# Patient Record
Sex: FEMALE | Race: WHITE | NOT HISPANIC OR LATINO | URBAN - METROPOLITAN AREA
[De-identification: names, ages, dates, MRNs, and addresses within clinical notes are randomized per-mention and may not be internally consistent; named-entity substitution may affect disease eponyms.]

---

## 2017-01-16 ENCOUNTER — IMPORTED ENCOUNTER (OUTPATIENT)
Dept: URBAN - METROPOLITAN AREA CLINIC 50 | Facility: CLINIC | Age: 82
End: 2017-01-16

## 2017-01-20 ENCOUNTER — IMPORTED ENCOUNTER (OUTPATIENT)
Dept: URBAN - METROPOLITAN AREA CLINIC 50 | Facility: CLINIC | Age: 82
End: 2017-01-20

## 2017-03-16 PROBLEM — Z94.7: Noted: 2020-01-15

## 2017-03-16 PROBLEM — Z98.890: Noted: 2017-03-16

## 2017-05-15 ENCOUNTER — IMPORTED ENCOUNTER (OUTPATIENT)
Dept: URBAN - METROPOLITAN AREA CLINIC 50 | Facility: CLINIC | Age: 82
End: 2017-05-15

## 2017-09-25 ENCOUNTER — IMPORTED ENCOUNTER (OUTPATIENT)
Dept: URBAN - METROPOLITAN AREA CLINIC 50 | Facility: CLINIC | Age: 82
End: 2017-09-25

## 2018-03-26 ENCOUNTER — IMPORTED ENCOUNTER (OUTPATIENT)
Dept: URBAN - METROPOLITAN AREA CLINIC 50 | Facility: CLINIC | Age: 83
End: 2018-03-26

## 2018-04-20 ENCOUNTER — IMPORTED ENCOUNTER (OUTPATIENT)
Dept: URBAN - METROPOLITAN AREA CLINIC 50 | Facility: CLINIC | Age: 83
End: 2018-04-20

## 2018-04-30 ENCOUNTER — IMPORTED ENCOUNTER (OUTPATIENT)
Dept: URBAN - METROPOLITAN AREA CLINIC 50 | Facility: CLINIC | Age: 83
End: 2018-04-30

## 2018-05-07 ENCOUNTER — IMPORTED ENCOUNTER (OUTPATIENT)
Dept: URBAN - METROPOLITAN AREA CLINIC 50 | Facility: CLINIC | Age: 83
End: 2018-05-07

## 2018-09-14 ENCOUNTER — IMPORTED ENCOUNTER (OUTPATIENT)
Dept: URBAN - METROPOLITAN AREA CLINIC 50 | Facility: CLINIC | Age: 83
End: 2018-09-14

## 2018-10-04 ENCOUNTER — IMPORTED ENCOUNTER (OUTPATIENT)
Dept: URBAN - METROPOLITAN AREA CLINIC 50 | Facility: CLINIC | Age: 83
End: 2018-10-04

## 2018-10-05 ENCOUNTER — IMPORTED ENCOUNTER (OUTPATIENT)
Dept: URBAN - METROPOLITAN AREA CLINIC 50 | Facility: CLINIC | Age: 83
End: 2018-10-05

## 2018-10-23 ENCOUNTER — IMPORTED ENCOUNTER (OUTPATIENT)
Dept: URBAN - METROPOLITAN AREA CLINIC 50 | Facility: CLINIC | Age: 83
End: 2018-10-23

## 2018-11-16 ENCOUNTER — IMPORTED ENCOUNTER (OUTPATIENT)
Dept: URBAN - METROPOLITAN AREA CLINIC 50 | Facility: CLINIC | Age: 83
End: 2018-11-16

## 2019-02-28 ENCOUNTER — IMPORTED ENCOUNTER (OUTPATIENT)
Dept: URBAN - METROPOLITAN AREA CLINIC 50 | Facility: CLINIC | Age: 84
End: 2019-02-28

## 2019-03-04 ENCOUNTER — IMPORTED ENCOUNTER (OUTPATIENT)
Dept: URBAN - METROPOLITAN AREA CLINIC 50 | Facility: CLINIC | Age: 84
End: 2019-03-04

## 2019-04-01 ENCOUNTER — IMPORTED ENCOUNTER (OUTPATIENT)
Dept: URBAN - METROPOLITAN AREA CLINIC 50 | Facility: CLINIC | Age: 84
End: 2019-04-01

## 2019-04-08 ENCOUNTER — IMPORTED ENCOUNTER (OUTPATIENT)
Dept: URBAN - METROPOLITAN AREA CLINIC 50 | Facility: CLINIC | Age: 84
End: 2019-04-08

## 2019-06-11 ENCOUNTER — IMPORTED ENCOUNTER (OUTPATIENT)
Dept: URBAN - METROPOLITAN AREA CLINIC 50 | Facility: CLINIC | Age: 84
End: 2019-06-11

## 2019-08-22 ENCOUNTER — IMPORTED ENCOUNTER (OUTPATIENT)
Dept: URBAN - METROPOLITAN AREA CLINIC 50 | Facility: CLINIC | Age: 84
End: 2019-08-22

## 2019-09-04 ENCOUNTER — IMPORTED ENCOUNTER (OUTPATIENT)
Dept: URBAN - METROPOLITAN AREA CLINIC 50 | Facility: CLINIC | Age: 84
End: 2019-09-04

## 2019-09-20 ENCOUNTER — IMPORTED ENCOUNTER (OUTPATIENT)
Dept: URBAN - METROPOLITAN AREA CLINIC 50 | Facility: CLINIC | Age: 84
End: 2019-09-20

## 2019-10-16 ENCOUNTER — IMPORTED ENCOUNTER (OUTPATIENT)
Dept: URBAN - METROPOLITAN AREA CLINIC 50 | Facility: CLINIC | Age: 84
End: 2019-10-16

## 2019-10-18 ENCOUNTER — IMPORTED ENCOUNTER (OUTPATIENT)
Dept: URBAN - METROPOLITAN AREA CLINIC 50 | Facility: CLINIC | Age: 84
End: 2019-10-18

## 2019-11-22 ENCOUNTER — IMPORTED ENCOUNTER (OUTPATIENT)
Dept: URBAN - METROPOLITAN AREA CLINIC 50 | Facility: CLINIC | Age: 84
End: 2019-11-22

## 2019-12-05 ENCOUNTER — IMPORTED ENCOUNTER (OUTPATIENT)
Dept: URBAN - METROPOLITAN AREA CLINIC 50 | Facility: CLINIC | Age: 84
End: 2019-12-05

## 2019-12-05 NOTE — PATIENT DISCUSSION
"""Recommend lubrication with artificial tears and flax seed oil 1000mg orally twice a day.  Claribel ""

## 2019-12-13 ENCOUNTER — IMPORTED ENCOUNTER (OUTPATIENT)
Dept: URBAN - METROPOLITAN AREA CLINIC 50 | Facility: CLINIC | Age: 84
End: 2019-12-13

## 2019-12-18 ENCOUNTER — IMPORTED ENCOUNTER (OUTPATIENT)
Dept: URBAN - METROPOLITAN AREA CLINIC 50 | Facility: CLINIC | Age: 84
End: 2019-12-18

## 2019-12-30 ENCOUNTER — IMPORTED ENCOUNTER (OUTPATIENT)
Dept: URBAN - METROPOLITAN AREA CLINIC 50 | Facility: CLINIC | Age: 84
End: 2019-12-30

## 2020-01-09 ENCOUNTER — IMPORTED ENCOUNTER (OUTPATIENT)
Dept: URBAN - METROPOLITAN AREA CLINIC 50 | Facility: CLINIC | Age: 85
End: 2020-01-09

## 2020-01-10 ENCOUNTER — IMPORTED ENCOUNTER (OUTPATIENT)
Dept: URBAN - METROPOLITAN AREA CLINIC 50 | Facility: CLINIC | Age: 85
End: 2020-01-10

## 2020-01-15 ENCOUNTER — IMPORTED ENCOUNTER (OUTPATIENT)
Dept: URBAN - METROPOLITAN AREA CLINIC 50 | Facility: CLINIC | Age: 85
End: 2020-01-15

## 2020-01-15 PROBLEM — Z98.890: Noted: 2017-03-16

## 2020-01-15 PROBLEM — Z94.7: Noted: 2020-01-15

## 2020-01-15 NOTE — PATIENT DISCUSSION
"""S/P DSEK OD. Graft attached and clearing.  Continue post - op drops."" ""Start Durezol right eye three times a day

## 2020-01-17 ENCOUNTER — IMPORTED ENCOUNTER (OUTPATIENT)
Dept: URBAN - METROPOLITAN AREA CLINIC 50 | Facility: CLINIC | Age: 85
End: 2020-01-17

## 2020-01-23 ENCOUNTER — IMPORTED ENCOUNTER (OUTPATIENT)
Dept: URBAN - METROPOLITAN AREA CLINIC 50 | Facility: CLINIC | Age: 85
End: 2020-01-23

## 2020-02-10 ENCOUNTER — IMPORTED ENCOUNTER (OUTPATIENT)
Dept: URBAN - METROPOLITAN AREA CLINIC 50 | Facility: CLINIC | Age: 85
End: 2020-02-10

## 2020-03-05 ENCOUNTER — IMPORTED ENCOUNTER (OUTPATIENT)
Dept: URBAN - METROPOLITAN AREA CLINIC 50 | Facility: CLINIC | Age: 85
End: 2020-03-05

## 2020-03-11 ENCOUNTER — IMPORTED ENCOUNTER (OUTPATIENT)
Dept: URBAN - METROPOLITAN AREA CLINIC 50 | Facility: CLINIC | Age: 85
End: 2020-03-11

## 2020-04-16 ENCOUNTER — IMPORTED ENCOUNTER (OUTPATIENT)
Dept: URBAN - METROPOLITAN AREA CLINIC 50 | Facility: CLINIC | Age: 85
End: 2020-04-16

## 2020-05-01 NOTE — PATIENT DISCUSSION
"""No gtts
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  Recommend vitamins
Review of Systems:  	•	CONSTITUTIONAL: no fever, no diaphoresis, no chills  	•	SKIN: no rash  	•	HEMATOLOGIC: no bleeding, no bruising  	•	EYES: no eye pain, no blurry vision  	•	ENT: no change in hearing, no tinnitus, no ear pain, no sore throat, no difficulty swallowing, no drooling   	•	RESPIRATORY: no shortness of breath, no cough  	•	CARDIAC: no chest pain, no palpitations  	•	GI: no abd pain, no nausea, no vomiting, no diarrhea  	•	GENITO-URINARY: no discharge, no dysuria; no hematuria  	•	MUSCULOSKELETAL: no joint paint, no swelling, no redness  	•	NEUROLOGIC: +R facial numbness, no weakness, no paresthesias, no syncope, no head injury, no drooling, no slurred speech   	•	PSYCH: no anxiety, non suicidal, non homicidal, no hallucination, no depression

## 2020-05-07 ENCOUNTER — IMPORTED ENCOUNTER (OUTPATIENT)
Dept: URBAN - METROPOLITAN AREA CLINIC 50 | Facility: CLINIC | Age: 85
End: 2020-05-07

## 2020-06-10 ENCOUNTER — IMPORTED ENCOUNTER (OUTPATIENT)
Dept: URBAN - METROPOLITAN AREA CLINIC 50 | Facility: CLINIC | Age: 85
End: 2020-06-10

## 2020-09-01 ENCOUNTER — IMPORTED ENCOUNTER (OUTPATIENT)
Dept: URBAN - METROPOLITAN AREA CLINIC 50 | Facility: CLINIC | Age: 85
End: 2020-09-01

## 2020-09-17 ENCOUNTER — IMPORTED ENCOUNTER (OUTPATIENT)
Dept: URBAN - METROPOLITAN AREA CLINIC 50 | Facility: CLINIC | Age: 85
End: 2020-09-17

## 2020-10-29 ENCOUNTER — IMPORTED ENCOUNTER (OUTPATIENT)
Dept: URBAN - METROPOLITAN AREA CLINIC 50 | Facility: CLINIC | Age: 85
End: 2020-10-29

## 2020-11-10 ENCOUNTER — IMPORTED ENCOUNTER (OUTPATIENT)
Dept: URBAN - METROPOLITAN AREA CLINIC 50 | Facility: CLINIC | Age: 85
End: 2020-11-10

## 2020-11-19 ENCOUNTER — IMPORTED ENCOUNTER (OUTPATIENT)
Dept: URBAN - METROPOLITAN AREA CLINIC 50 | Facility: CLINIC | Age: 85
End: 2020-11-19

## 2021-01-25 ENCOUNTER — IMPORTED ENCOUNTER (OUTPATIENT)
Dept: URBAN - METROPOLITAN AREA CLINIC 50 | Facility: CLINIC | Age: 86
End: 2021-01-25

## 2021-03-17 ENCOUNTER — IMPORTED ENCOUNTER (OUTPATIENT)
Dept: URBAN - METROPOLITAN AREA CLINIC 50 | Facility: CLINIC | Age: 86
End: 2021-03-17

## 2021-04-02 ENCOUNTER — IMPORTED ENCOUNTER (OUTPATIENT)
Dept: URBAN - METROPOLITAN AREA CLINIC 50 | Facility: CLINIC | Age: 86
End: 2021-04-02

## 2021-04-05 ENCOUNTER — IMPORTED ENCOUNTER (OUTPATIENT)
Dept: URBAN - METROPOLITAN AREA CLINIC 50 | Facility: CLINIC | Age: 86
End: 2021-04-05

## 2021-04-13 ENCOUNTER — IMPORTED ENCOUNTER (OUTPATIENT)
Dept: URBAN - METROPOLITAN AREA CLINIC 50 | Facility: CLINIC | Age: 86
End: 2021-04-13

## 2021-04-17 ASSESSMENT — PACHYMETRY
OS_CT_UM: 614
OD_CT_UM: 635
OS_CT_UM: 614
OD_CT_UM: 635
OS_CT_UM: 614
OS_CT_UM: 614
OD_CT_UM: 635
OS_CT_UM: 614
OS_CT_UM: 614
OD_CT_UM: 635
OS_CT_UM: 614
OD_CT_UM: 635
OS_CT_UM: 614
OD_CT_UM: 635
OS_CT_UM: 614
OD_CT_UM: 635
OS_CT_UM: 614
OD_CT_UM: 635
OD_CT_UM: 635
OS_CT_UM: 614
OS_CT_UM: 614
OD_CT_UM: 635
OS_CT_UM: 614
OS_CT_UM: 614
OD_CT_UM: 635
OS_CT_UM: 614
OD_CT_UM: 635
OS_CT_UM: 614
OS_CT_UM: 614
OD_CT_UM: 635
OS_CT_UM: 614
OD_CT_UM: 635
OS_CT_UM: 614
OD_CT_UM: 635
OS_CT_UM: 614
OD_CT_UM: 635
OD_CT_UM: 635
OS_CT_UM: 614
OS_CT_UM: 614
OD_CT_UM: 635
OS_CT_UM: 614
OD_CT_UM: 635
OD_CT_UM: 635
OS_CT_UM: 614
OD_CT_UM: 635
OD_CT_UM: 635
OS_CT_UM: 614
OD_CT_UM: 635
OS_CT_UM: 614
OD_CT_UM: 635
OS_CT_UM: 614
OD_CT_UM: 635
OS_CT_UM: 614
OS_CT_UM: 614
OD_CT_UM: 635
OS_CT_UM: 614
OD_CT_UM: 635
OS_CT_UM: 614
OD_CT_UM: 635
OS_CT_UM: 614
OS_CT_UM: 614
OD_CT_UM: 635
OS_CT_UM: 614
OS_CT_UM: 614

## 2021-04-17 ASSESSMENT — TONOMETRY
OD_IOP_MMHG: 7
OD_IOP_MMHG: 12
OD_IOP_MMHG: 11
OD_IOP_MMHG: 8
OS_IOP_MMHG: 9
OS_IOP_MMHG: 12
OS_IOP_MMHG: 10
OS_IOP_MMHG: 7
OS_IOP_MMHG: 9
OD_IOP_MMHG: 11
OS_IOP_MMHG: 11
OD_IOP_MMHG: 8
OS_IOP_MMHG: 7
OD_IOP_MMHG: 6
OD_IOP_MMHG: 12
OS_IOP_MMHG: 8
OS_IOP_MMHG: 10
OS_IOP_MMHG: 8
OD_IOP_MMHG: 12
OD_IOP_MMHG: 4
OS_IOP_MMHG: 10
OD_IOP_MMHG: 12
OD_IOP_MMHG: 10
OS_IOP_MMHG: 12
OD_IOP_MMHG: 8
OS_IOP_MMHG: 9
OS_IOP_MMHG: 9
OS_IOP_MMHG: 7
OS_IOP_MMHG: 10
OS_IOP_MMHG: 12
OS_IOP_MMHG: 10
OD_IOP_MMHG: 6
OD_IOP_MMHG: 10
OD_IOP_MMHG: 20
OS_IOP_MMHG: 5
OD_IOP_MMHG: 11
OS_IOP_MMHG: 8
OD_IOP_MMHG: 8
OD_IOP_MMHG: 8
OD_IOP_MMHG: 9
OS_IOP_MMHG: 10
OS_IOP_MMHG: 7
OS_IOP_MMHG: 14
OD_IOP_MMHG: 8
OD_IOP_MMHG: 12
OS_IOP_MMHG: 11
OD_IOP_MMHG: 7
OD_IOP_MMHG: 8
OD_IOP_MMHG: 6
OS_IOP_MMHG: 6
OS_IOP_MMHG: 8
OS_IOP_MMHG: 8
OD_IOP_MMHG: 11
OD_IOP_MMHG: 8
OS_IOP_MMHG: 11
OS_IOP_MMHG: 10
OS_IOP_MMHG: 11
OS_IOP_MMHG: 7
OD_IOP_MMHG: 7
OS_IOP_MMHG: 8
OS_IOP_MMHG: 7
OD_IOP_MMHG: 4
OS_IOP_MMHG: 11
OD_IOP_MMHG: 4
OS_IOP_MMHG: 11
OD_IOP_MMHG: 10
OD_IOP_MMHG: 11
OS_IOP_MMHG: 10
OS_IOP_MMHG: 7
OD_IOP_MMHG: 7
OD_IOP_MMHG: 6
OD_IOP_MMHG: 10
OS_IOP_MMHG: 7
OS_IOP_MMHG: 10
OS_IOP_MMHG: 13
OD_IOP_MMHG: 08
OD_IOP_MMHG: 5
OD_IOP_MMHG: 7
OD_IOP_MMHG: 12

## 2021-04-17 ASSESSMENT — VISUAL ACUITY
OS_CC: 20/50
OS_CC: 20/30
OD_CC: 20/200
OS_CC: J2
OD_CC: 20/80-
OD_CC: J5
OD_CC: 20/200
OD_CC: 20/80-
OS_PH: 20/40
OD_CC: J2@ 14 IN
OS_CC: 20/70+1
OS_CC: 20/40
OS_CC: 20/60-2
OS_CC: 20/25+
OD_CC: J2
OD_CC: CF@2FT
OD_CC: 20/150+1
OD_CC: 20/CF
OD_CC: 20/400
OS_CC: J2
OS_CC: 20/30-+
OS_CC: 20/40
OS_CC: 20/40
OS_CC: 20/25
OD_CC: J2
OD_SC: 20/400
OS_PH: @ 17 IN
OS_CC: 20/70-1
OS_CC: 20/40+
OD_CC: 20/400
OD_CC: CF @ 4'
OS_CC: 20/25-
OD_CC: CF @ 3FT
OS_PH: 20/30-1
OD_CC: 20/100-
OS_PH: 20/60+1
OD_CC: 20/400
OS_PH: 20/50
OS_CC: 20/50-1
OS_CC: 20/40
OS_CC: 20/40-1
OD_PH: 20/70+2
OS_CC: 20/40
OS_CC: 20/25-1
OD_CC: 20/100
OS_PH: 20/50
OS_CC: 20/25
OS_CC: J2
OD_PH: 20/70
OS_CC: 20/40
OD_CC: 20/100
OD_CC: 20/400
OD_CC: J2
OS_CC: J5
OD_CC: 20/100-
OS_CC: 20/30
OD_SC: CF @ 3'
OD_CC: 20/200+1
OS_CC: J2@ 14 IN

## 2021-04-22 ENCOUNTER — PRE-OP - (OUTPATIENT)
Dept: URBAN - METROPOLITAN AREA CLINIC 53 | Facility: CLINIC | Age: 86
End: 2021-04-22

## 2021-04-22 VITALS — DIASTOLIC BLOOD PRESSURE: 74 MMHG | SYSTOLIC BLOOD PRESSURE: 125 MMHG | HEART RATE: 62 BPM | HEIGHT: 55 IN

## 2021-04-22 DIAGNOSIS — H18.512: ICD-10-CM

## 2021-04-22 PROCEDURE — PREOP PRE OP VISIT

## 2021-04-22 ASSESSMENT — VISUAL ACUITY
OD_PH: 20/70
OD_CC: 20/100
OS_PH: 20/40
OS_CC: 20/50

## 2021-04-22 ASSESSMENT — TONOMETRY
OD_IOP_MMHG: 12
OS_IOP_MMHG: 09
OD_IOP_MMHG: 08
OS_IOP_MMHG: 12

## 2021-05-11 ENCOUNTER — SURGERY/PROCEDURE (OUTPATIENT)
Dept: URBAN - METROPOLITAN AREA SURGERY 16 | Facility: SURGERY | Age: 86
End: 2021-05-11

## 2021-05-11 DIAGNOSIS — H18.512: ICD-10-CM

## 2021-05-11 PROCEDURE — 65756 CORNEAL TRNSPL ENDOTHELIAL: CPT

## 2021-05-12 ENCOUNTER — 1 DAY POST-OP (OUTPATIENT)
Dept: URBAN - METROPOLITAN AREA CLINIC 50 | Facility: CLINIC | Age: 86
End: 2021-05-12

## 2021-05-12 DIAGNOSIS — H18.512: ICD-10-CM

## 2021-05-12 DIAGNOSIS — Z94.7: ICD-10-CM

## 2021-05-12 PROCEDURE — 99024 POSTOP FOLLOW-UP VISIT: CPT

## 2021-05-12 RX ORDER — DUREZOL 0.5 MG/ML
1 EMULSION OPHTHALMIC
Start: 2021-05-12

## 2021-05-12 ASSESSMENT — VISUAL ACUITY: OS_SC: 20/400

## 2021-05-12 NOTE — PATIENT DISCUSSION
Only 20% air bubble today due to presence of surgical PI.  More air added today.  Ofloxacin added after air placed.  IOP checked and reduced to 18 mm Hg.  Patient asked to wear shield at all times except for instilling drops.  Continue post op drops and f/u tomorrow in [facility].

## 2021-05-13 ENCOUNTER — 1 DAY POST-OP (OUTPATIENT)
Dept: URBAN - METROPOLITAN AREA CLINIC 53 | Facility: CLINIC | Age: 86
End: 2021-05-13

## 2021-05-13 DIAGNOSIS — Z94.7: ICD-10-CM

## 2021-05-13 DIAGNOSIS — H18.512: ICD-10-CM

## 2021-05-13 PROCEDURE — 99024 POSTOP FOLLOW-UP VISIT: CPT

## 2021-05-13 ASSESSMENT — VISUAL ACUITY: OS_SC: CF 5FT

## 2021-05-17 ENCOUNTER — 1 WEEK POST-OP (OUTPATIENT)
Dept: URBAN - METROPOLITAN AREA CLINIC 49 | Facility: CLINIC | Age: 86
End: 2021-05-17

## 2021-05-17 DIAGNOSIS — H18.512: ICD-10-CM

## 2021-05-17 DIAGNOSIS — H16.223: ICD-10-CM

## 2021-05-17 DIAGNOSIS — Z94.7: ICD-10-CM

## 2021-05-17 PROCEDURE — 99024 POSTOP FOLLOW-UP VISIT: CPT

## 2021-05-17 ASSESSMENT — VISUAL ACUITY
OS_SC: 20/400
OD_SC: 20/100-2

## 2021-05-17 ASSESSMENT — TONOMETRY: OS_IOP_MMHG: 10

## 2021-05-17 NOTE — PATIENT DISCUSSION
Informed patient she does not have to lay flat anymore. And must continue to wear eyepatch at all times. Patient and patients care taker verbally understood.

## 2021-05-24 ENCOUNTER — 2 WEEK POST-OP (OUTPATIENT)
Dept: URBAN - METROPOLITAN AREA CLINIC 49 | Facility: CLINIC | Age: 86
End: 2021-05-24

## 2021-05-24 DIAGNOSIS — H16.223: ICD-10-CM

## 2021-05-24 DIAGNOSIS — Z94.7: ICD-10-CM

## 2021-05-24 DIAGNOSIS — H18.512: ICD-10-CM

## 2021-05-24 PROCEDURE — 99024 POSTOP FOLLOW-UP VISIT: CPT

## 2021-05-24 ASSESSMENT — VISUAL ACUITY
OS_SC: 20/100
OS_PH: 20/80-1
OU_SC: 20/100
OD_SC: 20/80-1

## 2021-05-24 ASSESSMENT — TONOMETRY: OS_IOP_MMHG: 11

## 2021-06-10 ENCOUNTER — 2 WEEK POST-OP (OUTPATIENT)
Dept: URBAN - METROPOLITAN AREA CLINIC 53 | Facility: CLINIC | Age: 86
End: 2021-06-10

## 2021-06-10 DIAGNOSIS — H16.223: ICD-10-CM

## 2021-06-10 DIAGNOSIS — Z94.7: ICD-10-CM

## 2021-06-10 DIAGNOSIS — H52.4: ICD-10-CM

## 2021-06-10 PROCEDURE — 99024 POSTOP FOLLOW-UP VISIT: CPT

## 2021-06-10 PROCEDURE — 92015 DETERMINE REFRACTIVE STATE: CPT

## 2021-06-10 RX ORDER — PREDNISOLONE ACETATE 10 MG/ML
1 SUSPENSION/ DROPS OPHTHALMIC ONCE A DAY
Start: 2021-06-10

## 2021-06-10 ASSESSMENT — VISUAL ACUITY
OD_CC: 20/100
OS_CC: 20/80
OU_CC: J3
OS_PH: 20/60

## 2021-09-09 ENCOUNTER — 3 MONTH FOLLOW-UP (OUTPATIENT)
Dept: URBAN - METROPOLITAN AREA CLINIC 53 | Facility: CLINIC | Age: 86
End: 2021-09-09

## 2021-09-09 DIAGNOSIS — Z94.7: ICD-10-CM

## 2021-09-09 DIAGNOSIS — H16.223: ICD-10-CM

## 2021-09-09 PROCEDURE — 92012 INTRM OPH EXAM EST PATIENT: CPT

## 2021-09-09 RX ORDER — PREDNISOLONE ACETATE 10 MG/ML: 1 SUSPENSION/ DROPS OPHTHALMIC ONCE A DAY

## 2021-09-09 RX ORDER — LATANOPROST 50 UG/ML: 1 SOLUTION/ DROPS OPHTHALMIC EVERY EVENING

## 2021-09-09 ASSESSMENT — VISUAL ACUITY
OS_PH: 20/25
OD_CC: 20/150
OS_CC: 20/50-1

## 2021-09-09 ASSESSMENT — TONOMETRY
OS_IOP_MMHG: 09
OD_IOP_MMHG: 09

## 2021-11-29 ENCOUNTER — EMERGENCY VISIT (OUTPATIENT)
Dept: URBAN - METROPOLITAN AREA CLINIC 49 | Facility: CLINIC | Age: 86
End: 2021-11-29

## 2021-11-29 DIAGNOSIS — Z94.7: ICD-10-CM

## 2021-11-29 DIAGNOSIS — H16.223: ICD-10-CM

## 2021-11-29 PROCEDURE — 92012 INTRM OPH EXAM EST PATIENT: CPT

## 2021-11-29 ASSESSMENT — VISUAL ACUITY
OS_PH: 20/40
OD_CC: 20/200
OS_CC: 20/50+2

## 2021-11-29 ASSESSMENT — TONOMETRY
OD_IOP_MMHG: 11
OS_IOP_MMHG: 10

## 2022-04-28 ENCOUNTER — ESTABLISHED PATIENT (OUTPATIENT)
Dept: URBAN - METROPOLITAN AREA CLINIC 53 | Facility: CLINIC | Age: 87
End: 2022-04-28

## 2022-04-28 DIAGNOSIS — H16.223: ICD-10-CM

## 2022-04-28 DIAGNOSIS — H40.1131: ICD-10-CM

## 2022-04-28 DIAGNOSIS — H18.513: ICD-10-CM

## 2022-04-28 PROCEDURE — 92134 CPTRZ OPH DX IMG PST SGM RTA: CPT

## 2022-04-28 PROCEDURE — 92014 COMPRE OPH EXAM EST PT 1/>: CPT

## 2022-04-28 ASSESSMENT — TONOMETRY
OD_IOP_MMHG: 12
OS_IOP_MMHG: 12

## 2022-04-28 ASSESSMENT — VISUAL ACUITY
OD_PH: 20/100
OD_CC: 20/150-1
OS_CC: 20/30-1
OU_CC: 20/40+1

## 2022-04-28 NOTE — PATIENT DISCUSSION
S/P DSEK OU. Graft clear and intact. s/p fall vision stable and refraction is stable. F/U with SW for Glaucoma care if not available please book with optom.

## 2022-08-17 ENCOUNTER — DIAGNOSTICS ONLY (OUTPATIENT)
Dept: URBAN - METROPOLITAN AREA CLINIC 53 | Facility: CLINIC | Age: 87
End: 2022-08-17

## 2022-08-17 DIAGNOSIS — H40.1131: ICD-10-CM

## 2022-08-17 PROCEDURE — 92133 CPTRZD OPH DX IMG PST SGM ON: CPT

## 2022-08-17 PROCEDURE — 92083 EXTENDED VISUAL FIELD XM: CPT

## 2022-08-29 ENCOUNTER — COMPREHENSIVE EXAM (OUTPATIENT)
Dept: URBAN - METROPOLITAN AREA CLINIC 53 | Facility: CLINIC | Age: 87
End: 2022-08-29

## 2022-08-29 DIAGNOSIS — H18.513: ICD-10-CM

## 2022-08-29 DIAGNOSIS — H40.1131: ICD-10-CM

## 2022-08-29 PROCEDURE — 92012 INTRM OPH EXAM EST PATIENT: CPT

## 2022-08-29 ASSESSMENT — VISUAL ACUITY
OS_CC: 20/40
OD_CC: 20/250
OS_PH: 20/30
OD_PH: 20/70

## 2022-08-29 ASSESSMENT — TONOMETRY
OD_IOP_MMHG: 15
OS_IOP_MMHG: 11

## 2022-11-18 ENCOUNTER — COMPREHENSIVE EXAM (OUTPATIENT)
Dept: URBAN - METROPOLITAN AREA CLINIC 49 | Facility: CLINIC | Age: 87
End: 2022-11-18

## 2022-11-18 DIAGNOSIS — S09.93XA: ICD-10-CM

## 2022-11-18 PROCEDURE — 92014 COMPRE OPH EXAM EST PT 1/>: CPT

## 2022-11-18 ASSESSMENT — TONOMETRY
OS_IOP_MMHG: 12
OD_IOP_MMHG: 12

## 2022-11-18 ASSESSMENT — VISUAL ACUITY
OD_PH: 20/70
OD_CC: 20/200-1
OS_CC: 20/40-1

## 2022-12-19 ENCOUNTER — FOLLOW UP (OUTPATIENT)
Dept: URBAN - METROPOLITAN AREA CLINIC 53 | Facility: CLINIC | Age: 87
End: 2022-12-19

## 2022-12-19 PROCEDURE — 92012 INTRM OPH EXAM EST PATIENT: CPT

## 2022-12-19 ASSESSMENT — VISUAL ACUITY
OS_CC: 20/40
OD_CC: CF 5FT
OU_CC: J2 @ 16"

## 2022-12-19 ASSESSMENT — TONOMETRY
OD_IOP_MMHG: 12
OS_IOP_MMHG: 13

## 2022-12-19 NOTE — PATIENT DISCUSSION
Slight edema noted in today's exam patient to call if she has any concerns of her vision.  Will see patient in 4 months for her dilated exam.

## 2023-07-24 ENCOUNTER — ESTABLISHED PATIENT (OUTPATIENT)
Dept: URBAN - METROPOLITAN AREA CLINIC 53 | Facility: CLINIC | Age: 88
End: 2023-07-24

## 2023-07-24 DIAGNOSIS — H40.1131: ICD-10-CM

## 2023-07-24 DIAGNOSIS — H16.223: ICD-10-CM

## 2023-07-24 DIAGNOSIS — H18.513: ICD-10-CM

## 2023-07-24 PROCEDURE — 92014 COMPRE OPH EXAM EST PT 1/>: CPT

## 2023-07-24 RX ORDER — FLUOROMETHOLONE 1 MG/ML: 1 SOLUTION/ DROPS OPHTHALMIC TWICE A DAY

## 2023-07-24 ASSESSMENT — VISUAL ACUITY
OS_CC: 20/50-1
OD_CC: CF 6FT

## 2023-07-24 ASSESSMENT — TONOMETRY
OS_IOP_MMHG: 10
OD_IOP_MMHG: 10

## 2023-09-11 ENCOUNTER — FOLLOW UP (OUTPATIENT)
Dept: URBAN - METROPOLITAN AREA CLINIC 53 | Facility: CLINIC | Age: 88
End: 2023-09-11

## 2023-09-11 DIAGNOSIS — H18.513: ICD-10-CM

## 2023-09-11 DIAGNOSIS — H40.1131: ICD-10-CM

## 2023-09-11 PROCEDURE — 92012 INTRM OPH EXAM EST PATIENT: CPT

## 2023-09-11 ASSESSMENT — VISUAL ACUITY
OS_CC: 20/40-1
OD_CC: 20/400

## 2023-09-11 ASSESSMENT — TONOMETRY
OD_IOP_MMHG: 11
OS_IOP_MMHG: 10

## 2023-12-11 ENCOUNTER — ESTABLISHED PATIENT (OUTPATIENT)
Dept: URBAN - METROPOLITAN AREA CLINIC 53 | Facility: CLINIC | Age: 88
End: 2023-12-11

## 2023-12-11 DIAGNOSIS — H16.223: ICD-10-CM

## 2023-12-11 DIAGNOSIS — H18.513: ICD-10-CM

## 2023-12-11 PROCEDURE — 99213 OFFICE O/P EST LOW 20 MIN: CPT

## 2023-12-11 ASSESSMENT — TONOMETRY
OS_IOP_MMHG: 10
OD_IOP_MMHG: 11

## 2023-12-11 ASSESSMENT — VISUAL ACUITY
OS_CC: 20/40
OD_CC: 20/400

## 2024-01-15 ENCOUNTER — ESTABLISHED PATIENT (OUTPATIENT)
Dept: URBAN - METROPOLITAN AREA CLINIC 53 | Facility: CLINIC | Age: 89
End: 2024-01-15

## 2024-01-15 DIAGNOSIS — H18.513: ICD-10-CM

## 2024-01-15 DIAGNOSIS — H43.812: ICD-10-CM

## 2024-01-15 DIAGNOSIS — H16.223: ICD-10-CM

## 2024-01-15 DIAGNOSIS — H40.1131: ICD-10-CM

## 2024-01-15 PROCEDURE — 92015 DETERMINE REFRACTIVE STATE: CPT

## 2024-01-15 PROCEDURE — 99214 OFFICE O/P EST MOD 30 MIN: CPT

## 2024-01-15 PROCEDURE — 92134 CPTRZ OPH DX IMG PST SGM RTA: CPT

## 2024-01-15 ASSESSMENT — VISUAL ACUITY
OD_CC: CF 5FT
OU_CC: J5@16"
OS_CC: 20/50

## 2024-01-15 ASSESSMENT — TONOMETRY
OD_IOP_MMHG: 12
OS_IOP_MMHG: 10

## 2024-04-23 ENCOUNTER — APPOINTMENT (RX ONLY)
Dept: URBAN - METROPOLITAN AREA CLINIC 76 | Facility: CLINIC | Age: 89
Setting detail: DERMATOLOGY
End: 2024-04-23

## 2024-04-23 DIAGNOSIS — Z85.828 PERSONAL HISTORY OF OTHER MALIGNANT NEOPLASM OF SKIN: ICD-10-CM

## 2024-04-23 DIAGNOSIS — D49.2 NEOPLASM OF UNSPECIFIED BEHAVIOR OF BONE, SOFT TISSUE, AND SKIN: ICD-10-CM

## 2024-04-23 DIAGNOSIS — L71.8 OTHER ROSACEA: ICD-10-CM

## 2024-04-23 DIAGNOSIS — L82.0 INFLAMED SEBORRHEIC KERATOSIS: ICD-10-CM

## 2024-04-23 DIAGNOSIS — L82.1 OTHER SEBORRHEIC KERATOSIS: ICD-10-CM

## 2024-04-23 DIAGNOSIS — L81.4 OTHER MELANIN HYPERPIGMENTATION: ICD-10-CM

## 2024-04-23 DIAGNOSIS — D22 MELANOCYTIC NEVI: ICD-10-CM

## 2024-04-23 DIAGNOSIS — R21 RASH AND OTHER NONSPECIFIC SKIN ERUPTION: ICD-10-CM

## 2024-04-23 PROBLEM — D22.62 MELANOCYTIC NEVI OF LEFT UPPER LIMB, INCLUDING SHOULDER: Status: ACTIVE | Noted: 2024-04-23

## 2024-04-23 PROBLEM — D22.61 MELANOCYTIC NEVI OF RIGHT UPPER LIMB, INCLUDING SHOULDER: Status: ACTIVE | Noted: 2024-04-23

## 2024-04-23 PROCEDURE — 11103 TANGNTL BX SKIN EA SEP/ADDL: CPT | Mod: 59

## 2024-04-23 PROCEDURE — 11102 TANGNTL BX SKIN SINGLE LES: CPT | Mod: 59

## 2024-04-23 PROCEDURE — 17110 DESTRUCTION B9 LES UP TO 14: CPT

## 2024-04-23 PROCEDURE — ? LIQUID NITROGEN

## 2024-04-23 PROCEDURE — ? BIOPSY BY SHAVE METHOD

## 2024-04-23 PROCEDURE — ? PHOTO-DOCUMENTATION

## 2024-04-23 PROCEDURE — ? COUNSELING

## 2024-04-23 PROCEDURE — ? TREATMENT REGIMEN

## 2024-04-23 PROCEDURE — 99213 OFFICE O/P EST LOW 20 MIN: CPT | Mod: 25

## 2024-04-23 ASSESSMENT — LOCATION DETAILED DESCRIPTION DERM
LOCATION DETAILED: LEFT ANTERIOR DISTAL UPPER ARM
LOCATION DETAILED: LEFT PROXIMAL DORSAL FOREARM
LOCATION DETAILED: UPPER STERNUM
LOCATION DETAILED: LEFT INFERIOR CENTRAL MALAR CHEEK
LOCATION DETAILED: RIGHT SUPERIOR MEDIAL UPPER BACK
LOCATION DETAILED: LEFT SUPERIOR FOREHEAD
LOCATION DETAILED: RIGHT PROXIMAL DORSAL FOREARM
LOCATION DETAILED: RIGHT INFERIOR LATERAL MALAR CHEEK
LOCATION DETAILED: RIGHT INFERIOR CENTRAL MALAR CHEEK
LOCATION DETAILED: LEFT ANTERIOR LATERAL DISTAL UPPER ARM
LOCATION DETAILED: NASAL SUPRATIP
LOCATION DETAILED: INFERIOR THORACIC SPINE
LOCATION DETAILED: LEFT VENTRAL PROXIMAL FOREARM
LOCATION DETAILED: LEFT DISTAL POSTERIOR UPPER ARM

## 2024-04-23 ASSESSMENT — LOCATION ZONE DERM
LOCATION ZONE: FACE
LOCATION ZONE: NOSE
LOCATION ZONE: ARM
LOCATION ZONE: TRUNK

## 2024-04-23 ASSESSMENT — LOCATION SIMPLE DESCRIPTION DERM
LOCATION SIMPLE: LEFT POSTERIOR UPPER ARM
LOCATION SIMPLE: RIGHT FOREARM
LOCATION SIMPLE: CHEST
LOCATION SIMPLE: LEFT FOREHEAD
LOCATION SIMPLE: RIGHT CHEEK
LOCATION SIMPLE: LEFT FOREARM
LOCATION SIMPLE: LEFT UPPER ARM
LOCATION SIMPLE: NOSE
LOCATION SIMPLE: RIGHT UPPER BACK
LOCATION SIMPLE: LEFT CHEEK
LOCATION SIMPLE: UPPER BACK

## 2024-04-23 NOTE — PROCEDURE: BIOPSY BY SHAVE METHOD
Detail Level: Detailed
Depth Of Biopsy: dermis
Was A Bandage Applied: Yes
Size Of Lesion In Cm: 0.5
X Size Of Lesion In Cm: 0
Biopsy Type: H and E
Biopsy Method: Personna blade
Anesthesia Type: 1% lidocaine without epinephrine
Hemostasis: Electrocautery
Wound Care: Aquaphor
Dressing: Band-Aid
Destruction After The Procedure: No
Type Of Destruction Used: Curettage
Curettage Text: The wound bed was treated with curettage after the biopsy was performed.
Cryotherapy Text: The wound bed was treated with cryotherapy after the biopsy was performed.
Electrodesiccation Text: The wound bed was treated with electrodesiccation after the biopsy was performed.
Electrodesiccation And Curettage Text: The wound bed was treated with electrodesiccation and curettage after the biopsy was performed.
Silver Nitrate Text: The wound bed was treated with silver nitrate after the biopsy was performed.
Lab: [id number]
Path Notes (To The Dermatopathologist): Check margins.
Consent: Written consent was obtained and risks were reviewed including but not limited to scarring, infection, bleeding, scabbing, incomplete removal, nerve damage and allergy to anesthesia.
Post-Care Instructions: I reviewed with the patient in detail post-care instructions. Patient is to keep the biopsy site covered with Vaseline and bandage until healed.
Notification Instructions: Patient will be notified of biopsy results. However, patient instructed to call the office if not contacted within 2 weeks.
Billing Type: Third-Party Bill
Information: Selecting Yes will display possible errors in your note based on the variables you have selected. This validation is only offered as a suggestion for you. PLEASE NOTE THAT THE VALIDATION TEXT WILL BE REMOVED WHEN YOU FINALIZE YOUR NOTE. IF YOU WANT TO FAX A PRELIMINARY NOTE YOU WILL NEED TO TOGGLE THIS TO 'NO' IF YOU DO NOT WANT IT IN YOUR FAXED NOTE.

## 2024-04-23 NOTE — PROCEDURE: COUNSELING
Detail Level: Simple
Detail Level: Detailed
Detail Level: Generalized
Detail Level: Zone
Patient Specific Counseling (Will Not Stick From Patient To Patient): No treatment at this time.  Reduce sugars.

## 2024-04-23 NOTE — PROCEDURE: LIQUID NITROGEN
Number Of Freeze-Thaw Cycles: 2 freeze-thaw cycles
Include Z78.9 (Other Specified Conditions Influencing Health Status) As An Associated Diagnosis?: Yes
Spray Paint Technique: No
Application Tool (Optional): Liquid Nitrogen Sprayer
Spray Paint Text: The liquid nitrogen was applied to the skin utilizing a spray paint frosting technique.
Post-Care Instructions: I reviewed with the patient in detail post-care instructions. Patient is to wear sunprotection, and avoid picking at any of the treated lesions. Pt may apply Vaseline to crusted or scabbing areas.
Medical Necessity Information: It is in your best interest to select a reason for this procedure from the list below. All of these items fulfill various CMS LCD requirements except the new and changing color options.
Medical Necessity Clause: This procedure was medically necessary because the lesions that were treated were: irritated and growing
Detail Level: Simple
Consent: The patient's consent was obtained including but not limited to risks of crusting, scabbing, blistering, scarring, darker or lighter pigmentary change, recurrence, incomplete removal and infection.
Duration Of Freeze Thaw-Cycle (Seconds): 5

## 2024-05-06 ENCOUNTER — FOLLOW UP (OUTPATIENT)
Dept: URBAN - METROPOLITAN AREA CLINIC 53 | Facility: CLINIC | Age: 89
End: 2024-05-06

## 2024-05-06 DIAGNOSIS — H40.1131: ICD-10-CM

## 2024-05-06 PROCEDURE — 99213 OFFICE O/P EST LOW 20 MIN: CPT

## 2024-05-06 ASSESSMENT — VISUAL ACUITY
OS_CC: 20/50
OS_PH: 20/30

## 2024-05-06 ASSESSMENT — TONOMETRY
OS_IOP_MMHG: 13
OD_IOP_MMHG: 11

## 2024-09-16 ENCOUNTER — FOLLOW UP (OUTPATIENT)
Dept: URBAN - METROPOLITAN AREA CLINIC 53 | Facility: CLINIC | Age: 89
End: 2024-09-16

## 2024-09-16 DIAGNOSIS — H16.223: ICD-10-CM

## 2024-09-16 DIAGNOSIS — H40.1131: ICD-10-CM

## 2024-09-16 DIAGNOSIS — H18.513: ICD-10-CM

## 2024-09-16 PROCEDURE — 99213 OFFICE O/P EST LOW 20 MIN: CPT
